# Patient Record
Sex: FEMALE | Race: BLACK OR AFRICAN AMERICAN | NOT HISPANIC OR LATINO | URBAN - METROPOLITAN AREA
[De-identification: names, ages, dates, MRNs, and addresses within clinical notes are randomized per-mention and may not be internally consistent; named-entity substitution may affect disease eponyms.]

---

## 2021-07-14 ENCOUNTER — OFFICE VISIT (OUTPATIENT)
Dept: URBAN - METROPOLITAN AREA CLINIC 78 | Facility: CLINIC | Age: 56
End: 2021-07-14

## 2021-08-05 ENCOUNTER — OFFICE VISIT (OUTPATIENT)
Dept: URBAN - METROPOLITAN AREA CLINIC 78 | Facility: CLINIC | Age: 56
End: 2021-08-05

## 2021-08-18 ENCOUNTER — OFFICE VISIT (OUTPATIENT)
Dept: URBAN - METROPOLITAN AREA CLINIC 78 | Facility: CLINIC | Age: 56
End: 2021-08-18
Payer: COMMERCIAL

## 2021-08-18 ENCOUNTER — DASHBOARD ENCOUNTERS (OUTPATIENT)
Age: 56
End: 2021-08-18

## 2021-08-18 VITALS
HEIGHT: 63 IN | WEIGHT: 161.2 LBS | DIASTOLIC BLOOD PRESSURE: 88 MMHG | HEART RATE: 73 BPM | SYSTOLIC BLOOD PRESSURE: 159 MMHG | BODY MASS INDEX: 28.56 KG/M2 | TEMPERATURE: 97.6 F

## 2021-08-18 DIAGNOSIS — K50.819 CROHN'S DISEASE OF SMALL AND LARGE INTESTINES WITH COMPLICATION: ICD-10-CM

## 2021-08-18 DIAGNOSIS — Z93.2 ILEOSTOMY IN PLACE: ICD-10-CM

## 2021-08-18 DIAGNOSIS — E53.8 VITAMIN B 12 DEFICIENCY: ICD-10-CM

## 2021-08-18 PROBLEM — 302111002: Status: ACTIVE | Noted: 2021-08-18

## 2021-08-18 PROCEDURE — 99243 OFF/OP CNSLTJ NEW/EST LOW 30: CPT | Performed by: INTERNAL MEDICINE

## 2021-08-18 NOTE — HPI-TODAY'S VISIT:
Patient was referred by Dr. Kari Verma A copy of this document will be sent to the physician.  Patient s/p ex lap resection of aerocholic , gastrocolic fistulas . Patient is s/p Subcolectomy with Chavarria procedure and [place] ileostomy surgeon is Dr Moore  10/30/96   Dr Vallejo was her GI  Last Flex sig was in 2017 in [facility] ( she followed him every two years )  He comments that rectal stump was 11 cm on all previous flex but only 5 cm in recent one , after discussion with colorectal surgeon Dr Mejia no further explaination was offered.   Denies any symptoms  Osteomy output is good  Feels well  She was not put on any meds post surgery   Flex sig Biopsies :  Chronic non specific inflammation  Patient sees Dr Verma and  reportedly all her labs were ok   She used do shots and now she takes Sublingual Vit B 12

## 2021-08-18 NOTE — PHYSICAL EXAM GASTROINTESTINAL
Abdomen , soft, nontender, nondistended , no guarding or rigidity , no masses palpable , normal bowel sounds  Ileostomy in Right side  Laparotomy scar Liver and Spleen , no hepatomegaly present , liver nontender , spleen not palpable

## 2021-08-19 PROBLEM — 71833008: Status: ACTIVE | Noted: 2021-08-18

## 2021-08-19 LAB
VITAMIN B12: 1593
VITAMIN D, 25-HYDROXY: 31.4

## 2021-08-20 ENCOUNTER — OFFICE VISIT (OUTPATIENT)
Dept: URBAN - METROPOLITAN AREA SURGERY CENTER 15 | Facility: SURGERY CENTER | Age: 56
End: 2021-08-20
Payer: COMMERCIAL

## 2021-08-20 DIAGNOSIS — Z90.49 HISTORY OF COLECTOMY: ICD-10-CM

## 2021-08-20 DIAGNOSIS — K63.89 BACTERIAL OVERGROWTH SYNDROME: ICD-10-CM

## 2021-08-20 DIAGNOSIS — K50.812 CROHN'S DISEASE OF BOTH SMALL AND LARGE INTESTINE WITH INTESTINAL OBSTRUCTION: ICD-10-CM

## 2021-08-20 PROCEDURE — G8907 PT DOC NO EVENTS ON DISCHARG: HCPCS | Performed by: INTERNAL MEDICINE

## 2021-08-20 PROCEDURE — 45331 SIGMOIDOSCOPY AND BIOPSY: CPT | Performed by: INTERNAL MEDICINE

## 2021-08-21 ENCOUNTER — WEB ENCOUNTER (OUTPATIENT)
Dept: URBAN - METROPOLITAN AREA CLINIC 78 | Facility: CLINIC | Age: 56
End: 2021-08-21